# Patient Record
Sex: FEMALE | Race: BLACK OR AFRICAN AMERICAN | URBAN - METROPOLITAN AREA
[De-identification: names, ages, dates, MRNs, and addresses within clinical notes are randomized per-mention and may not be internally consistent; named-entity substitution may affect disease eponyms.]

---

## 2017-03-15 ENCOUNTER — TELEPHONE (OUTPATIENT)
Dept: INTERNAL MEDICINE CLINIC | Facility: CLINIC | Age: 57
End: 2017-03-15

## 2017-03-15 NOTE — TELEPHONE ENCOUNTER
Patient calling is wanting to know if it is safe to take OTC prenatal vitamins    And also wondering what vitamins do you suggest that she would take either OTC or rx.

## 2017-03-15 NOTE — TELEPHONE ENCOUNTER
Called and spoke to patient. Per patient, she felt her message was not clear enough. Per patient - she wants to know if folic acid in a prenatal vitamin will be beneficial to her health.   She states that there is so many multivitamins to pick from, and s

## 2017-03-16 NOTE — TELEPHONE ENCOUNTER
Called patient and was routed to voicemail. Left message on voicemail for patient to return office phone call, and request to speak to Esther Hansen.

## 2017-03-16 NOTE — TELEPHONE ENCOUNTER
Patient returned call and I relayed message from Dr. Mayda Samuel. Patient verbalized understanding and compliance to treatment.

## 2017-03-16 NOTE — TELEPHONE ENCOUNTER
She does not need pre  vitamins, as I said one a day for women centrum is ok, also yes she can take vit D 1000 iu daily and ca 400 mg daily

## 2018-02-16 ENCOUNTER — TELEPHONE (OUTPATIENT)
Dept: INTERNAL MEDICINE CLINIC | Facility: CLINIC | Age: 58
End: 2018-02-16

## 2018-02-16 NOTE — TELEPHONE ENCOUNTER
Pt contacts clinic stating she has undergone several dental procedures for which she has taken amoxicillin. Developed vaginal itching and burning. Self treated with 1 week of monistat.   Symptoms persist, was reading on line and thinks she may have BV and